# Patient Record
Sex: FEMALE | Race: ASIAN | NOT HISPANIC OR LATINO
[De-identification: names, ages, dates, MRNs, and addresses within clinical notes are randomized per-mention and may not be internally consistent; named-entity substitution may affect disease eponyms.]

---

## 2017-07-28 ENCOUNTER — LAB SERVICES (OUTPATIENT)
Dept: OTHER | Age: 27
End: 2017-07-28

## 2017-07-28 ENCOUNTER — CHARTING TRANS (OUTPATIENT)
Dept: OTHER | Age: 27
End: 2017-07-28

## 2017-07-28 LAB
BILIRUBIN: NORMAL
GLUCOSE U: NORMAL
KETONES: NORMAL
LEUKOCYTES: NORMAL
MONOCLONAL PREGNANCY: POSITIVE
NITRITE: NORMAL
OCCULT BLOOD: NORMAL
PH: 6
PROTEIN: NORMAL
URINE SPEC GRAVITY: 1.02
UROBILINOGEN: 0.2

## 2017-08-02 LAB — HPV I/H RISK 4 DNA CVX QL NAA+PROBE: NORMAL

## 2017-10-13 ENCOUNTER — CHARTING TRANS (OUTPATIENT)
Dept: OTHER | Age: 27
End: 2017-10-13

## 2017-10-19 ENCOUNTER — CHARTING TRANS (OUTPATIENT)
Dept: OTHER | Age: 27
End: 2017-10-19

## 2017-10-19 ENCOUNTER — HOSPITAL (OUTPATIENT)
Dept: OTHER | Age: 27
End: 2017-10-19
Attending: ADVANCED PRACTICE MIDWIFE

## 2017-10-19 ENCOUNTER — LAB SERVICES (OUTPATIENT)
Dept: OTHER | Age: 27
End: 2017-10-19

## 2017-10-19 LAB
BILIRUBIN: NEGATIVE
GLUCOSE U: NEGATIVE
KETONES: NEGATIVE
LEUKOCYTES: NORMAL
NITRITE: NEGATIVE
OCCULT BLOOD: NEGATIVE
PH: 6
PROTEIN: NEGATIVE
URINE SPEC GRAVITY: 1.02
UROBILINOGEN: 0.2

## 2017-10-20 ENCOUNTER — CHARTING TRANS (OUTPATIENT)
Dept: OTHER | Age: 27
End: 2017-10-20

## 2017-10-20 LAB
ABO + RH BLD: NORMAL
BASOPHILS # BLD: 0 K/MCL (ref 0–0.3)
BASOPHILS NFR BLD: 0 %
BLD GP AB SCN SERPL QL: NEGATIVE
DIFFERENTIAL METHOD BLD: ABNORMAL
EOSINOPHIL # BLD: 0.3 K/MCL (ref 0.1–0.5)
EOSINOPHIL NFR BLD: 2 %
ERYTHROCYTE [DISTWIDTH] IN BLOOD: 13.5 % (ref 11–15)
HBV SURFACE AG SER QL: NEGATIVE
HEMATOCRIT: 37.6 % (ref 36–46.5)
HEMOGLOBIN: 12.4 G/DL (ref 12–15.5)
HIV 1+2 AB+HIV1 P24 AG SERPL QL IA: NONREACTIVE
LYMPHOCYTES # BLD: 1.6 K/MCL (ref 1–4.8)
LYMPHOCYTES NFR BLD: 15 %
MEAN CORPUSCULAR HEMOGLOBIN: 31.2 PG (ref 26–34)
MEAN CORPUSCULAR HGB CONC: 33 G/DL (ref 32–36.5)
MEAN CORPUSCULAR VOLUME: 94.5 FL (ref 78–100)
MONOCYTES # BLD: 0.7 K/MCL (ref 0.3–0.9)
MONOCYTES NFR BLD: 6 %
NEUTROPHILS # BLD: 7.9 K/MCL (ref 1.8–7.7)
NEUTROPHILS NFR BLD: 77 %
PLATELET COUNT: 230 K/MCL (ref 140–450)
RED CELL COUNT: 3.98 MIL/MCL (ref 4–5.2)
RUBV IGG SERPL IA-ACNC: 340.3 UNITS/ML
T GONDII IGG SER-ACNC: <0.5 UNITS/ML
T GONDII IGM SER-ACNC: 0.47 INDEX
T PALLIDUM IGG SER QL IA: NONREACTIVE
VZV IGG SER IA-ACNC: 3.55 OD RATIO
WHITE BLOOD COUNT: 10.4 K/MCL (ref 4.2–11)

## 2017-10-23 LAB
BACTERIA UR CULT: NORMAL
C TRACH RRNA SPEC QL NAA+PROBE: NEGATIVE
N GONORRHOEA RRNA SPEC QL NAA+PROBE: NEGATIVE
SPECIMEN SOURCE: NORMAL

## 2017-12-04 ENCOUNTER — CHARTING TRANS (OUTPATIENT)
Dept: OTHER | Age: 27
End: 2017-12-04

## 2018-11-02 VITALS
TEMPERATURE: 98.6 F | HEIGHT: 61 IN | BODY MASS INDEX: 21.04 KG/M2 | RESPIRATION RATE: 16 BRPM | SYSTOLIC BLOOD PRESSURE: 106 MMHG | HEART RATE: 88 BPM | DIASTOLIC BLOOD PRESSURE: 63 MMHG | WEIGHT: 111.44 LBS

## 2018-11-03 VITALS
HEART RATE: 77 BPM | TEMPERATURE: 98.4 F | WEIGHT: 103 LBS | SYSTOLIC BLOOD PRESSURE: 106 MMHG | RESPIRATION RATE: 16 BRPM | DIASTOLIC BLOOD PRESSURE: 69 MMHG | BODY MASS INDEX: 19.45 KG/M2 | HEIGHT: 61 IN

## 2022-02-02 PROBLEM — Z00.00 ENCOUNTER FOR PREVENTIVE HEALTH EXAMINATION: Status: ACTIVE | Noted: 2022-02-02

## 2022-02-03 ENCOUNTER — OUTPATIENT (OUTPATIENT)
Dept: OUTPATIENT SERVICES | Facility: HOSPITAL | Age: 32
LOS: 1 days | Discharge: HOME | End: 2022-02-03
Payer: COMMERCIAL

## 2022-02-03 ENCOUNTER — APPOINTMENT (OUTPATIENT)
Dept: OBGYN | Facility: CLINIC | Age: 32
End: 2022-02-03
Payer: COMMERCIAL

## 2022-02-03 VITALS — DIASTOLIC BLOOD PRESSURE: 65 MMHG | HEART RATE: 88 BPM | SYSTOLIC BLOOD PRESSURE: 106 MMHG

## 2022-02-03 VITALS
DIASTOLIC BLOOD PRESSURE: 64 MMHG | HEART RATE: 83 BPM | TEMPERATURE: 98 F | RESPIRATION RATE: 18 BRPM | SYSTOLIC BLOOD PRESSURE: 115 MMHG

## 2022-02-03 VITALS
BODY MASS INDEX: 26.43 KG/M2 | HEART RATE: 102 BPM | WEIGHT: 140 LBS | HEIGHT: 61 IN | DIASTOLIC BLOOD PRESSURE: 74 MMHG | SYSTOLIC BLOOD PRESSURE: 108 MMHG

## 2022-02-03 DIAGNOSIS — O26.899 OTHER SPECIFIED PREGNANCY RELATED CONDITIONS, UNSPECIFIED TRIMESTER: ICD-10-CM

## 2022-02-03 DIAGNOSIS — Z98.890 OTHER SPECIFIED POSTPROCEDURAL STATES: Chronic | ICD-10-CM

## 2022-02-03 DIAGNOSIS — Z3A.00 WEEKS OF GESTATION OF PREGNANCY NOT SPECIFIED: ICD-10-CM

## 2022-02-03 PROCEDURE — 0502F SUBSEQUENT PRENATAL CARE: CPT

## 2022-02-03 PROCEDURE — 76818 FETAL BIOPHYS PROFILE W/NST: CPT | Mod: 26

## 2022-02-03 PROCEDURE — 59025 FETAL NON-STRESS TEST: CPT

## 2022-02-03 PROCEDURE — 99214 OFFICE O/P EST MOD 30 MIN: CPT

## 2022-02-03 RX ORDER — SODIUM CHLORIDE 9 MG/ML
1000 INJECTION, SOLUTION INTRAVENOUS ONCE
Refills: 0 | Status: COMPLETED | OUTPATIENT
Start: 2022-02-03 | End: 2022-02-03

## 2022-02-03 RX ADMIN — SODIUM CHLORIDE 1000 MILLILITER(S): 9 INJECTION, SOLUTION INTRAVENOUS at 20:35

## 2022-02-03 NOTE — OB PROVIDER TRIAGE NOTE - HISTORY OF PRESENT ILLNESS
30y/o  @38.4wks presents for prolonged monitoring for decelerations in the office. Patient also reports uterine cramping.  Reports good fetal movement  Denies LOF/VB    Allergies: Denies  Medications: Magnesium, PNV    Medical HX: Denies  Surgical HX: C/S 2018  Denies Etoh/Smoke/Drugs/Psy 30y/o  @38.4wks presents for prolonged monitoring for further evaluation s/p "low heart rate in office". Patient also reports uterine cramping 2/10.  Reports good fetal movement  Denies LOF/VB    Allergies: Denies  Medications: Magnesium, PNV    Medical HX: Denies  Surgical HX: C/S 2018  Denies Etoh/Smoke/Drugs/Psy

## 2022-02-03 NOTE — OB PROVIDER TRIAGE NOTE - ADDITIONAL INSTRUCTIONS
Follow up at next prenatal appointment  Patient to have C/S early next week. Repeat  Return for decreased fetal movement, loss of fluid or vaginal bleeding   Increase oral hydration  Signs and Symptoms of Labor reviewed   Kick Counts reviewed

## 2022-02-03 NOTE — OB PROVIDER TRIAGE NOTE - NS_OBGYNHISTORY_OBGYN_ALL_OB_FT
C/S FT 2/1/2018 6#9 NRFHR GYN:   OB:  C/S FT 2/1/2018 6#9 NRFHR      AP course uncomplicated  -Late transfer to Dr. Farias from [place] this week  -Scheduling c/s for early next week, Repeat

## 2022-02-03 NOTE — OB PROVIDER TRIAGE NOTE - NSHPPHYSICALEXAM_GEN_ALL_CORE
Vital Signs Last 24 Hrs  T(C): 36.7 (03 Feb 2022 20:02), Max: 36.7 (03 Feb 2022 20:02)  T(F): 98 (03 Feb 2022 20:02), Max: 98 (03 Feb 2022 20:02)  HR: 84 (03 Feb 2022 21:05) (81 - 96)  BP: 115/64 (03 Feb 2022 20:10) (115/64 - 115/64)  RR: 18 (03 Feb 2022 20:02) (18 - 18)  SpO2: 100% (03 Feb 2022 20:50) (100% - 100%)    Assessment reveals VSS  Abdomen soft, NT, gravid  Cat 1 tracing, ctx every 5 mins  Transabdominal Ultrasound-   Sterile Speculum-  Transvaginal Ultrasound-  Vaginal Exam-   A&Ox3  Lungs- clear bilateral  Heart- normal rate and rhythm      PLAN: Vital Signs Last 24 Hrs  T(C): 36.7 (03 Feb 2022 20:02), Max: 36.7 (03 Feb 2022 20:02)  T(F): 98 (03 Feb 2022 20:02), Max: 98 (03 Feb 2022 20:02)  HR: 84 (03 Feb 2022 21:05) (81 - 96)  BP: 115/64 (03 Feb 2022 20:10) (115/64 - 115/64)  RR: 18 (03 Feb 2022 20:02) (18 - 18)  SpO2: 100% (03 Feb 2022 20:50) (100% - 100%)    Assessment reveals VSS  Abdomen soft, NT, gravid  Cat 1 tracing, irregular ctx on toco   Transabdominal Ultrasound- vtx, post placenta, bpp 8/8, bethany: 13.18  Vaginal Exam- 1/60/-3  A&Ox3  Lungs- clear bilateral  Heart- normal rate and rhythm      PLAN: D/C Home

## 2022-02-03 NOTE — OB PROVIDER TRIAGE NOTE - PLAN OF CARE
D/C Home  D/W Dr. Nettles  NST reactive  No evidence of acute process  Normal fetal testing  Follow up at next prenatal appointment  Patient to have C/S early next week. Repeat  Return for decreased fetal movement, loss of fluid or vaginal bleeding   Increase oral hydration  Signs and Symptoms of Labor reviewed   Kick Counts reviewed

## 2022-02-03 NOTE — OB RN TRIAGE NOTE - FALL HARM RISK - UNIVERSAL INTERVENTIONS
Bed in lowest position, wheels locked, appropriate side rails in place/Call bell, personal items and telephone in reach/Instruct patient to call for assistance before getting out of bed or chair/Non-slip footwear when patient is out of bed/[place] to call system/Physically safe environment - no spills, clutter or unnecessary equipment/Purposeful Proactive Rounding/Room/bathroom lighting operational, light cord in reach

## 2022-02-04 ENCOUNTER — TRANSCRIPTION ENCOUNTER (OUTPATIENT)
Age: 32
End: 2022-02-04

## 2022-02-04 ENCOUNTER — APPOINTMENT (OUTPATIENT)
Dept: OBGYN | Facility: CLINIC | Age: 32
End: 2022-02-04

## 2022-02-04 DIAGNOSIS — O34.211 MATERNAL CARE FOR LOW TRANSVERSE SCAR FROM PREVIOUS CESAREAN DELIVERY: ICD-10-CM

## 2022-02-04 DIAGNOSIS — Z01.818 ENCOUNTER FOR OTHER PREPROCEDURAL EXAMINATION: ICD-10-CM

## 2022-02-04 LAB — SARS-COV-2 N GENE NPH QL NAA+PROBE: NOT DETECTED

## 2022-02-06 ENCOUNTER — TRANSCRIPTION ENCOUNTER (OUTPATIENT)
Age: 32
End: 2022-02-06

## 2022-02-07 ENCOUNTER — INPATIENT (INPATIENT)
Facility: HOSPITAL | Age: 32
LOS: 2 days | Discharge: HOME | End: 2022-02-10
Attending: STUDENT IN AN ORGANIZED HEALTH CARE EDUCATION/TRAINING PROGRAM | Admitting: STUDENT IN AN ORGANIZED HEALTH CARE EDUCATION/TRAINING PROGRAM
Payer: COMMERCIAL

## 2022-02-07 ENCOUNTER — TRANSCRIPTION ENCOUNTER (OUTPATIENT)
Age: 32
End: 2022-02-07

## 2022-02-07 ENCOUNTER — APPOINTMENT (OUTPATIENT)
Dept: OBGYN | Facility: HOSPITAL | Age: 32
End: 2022-02-07

## 2022-02-07 VITALS — HEART RATE: 81 BPM | DIASTOLIC BLOOD PRESSURE: 61 MMHG | SYSTOLIC BLOOD PRESSURE: 108 MMHG

## 2022-02-07 DIAGNOSIS — Z98.891 HISTORY OF UTERINE SCAR FROM PREVIOUS SURGERY: ICD-10-CM

## 2022-02-07 DIAGNOSIS — O34.211 MATERNAL CARE FOR LOW TRANSVERSE SCAR FROM PREVIOUS CESAREAN DELIVERY: ICD-10-CM

## 2022-02-07 DIAGNOSIS — O34.219 MATERNAL CARE FOR UNSPECIFIED TYPE SCAR FROM PREVIOUS CESAREAN DELIVERY: ICD-10-CM

## 2022-02-07 DIAGNOSIS — Z98.890 OTHER SPECIFIED POSTPROCEDURAL STATES: Chronic | ICD-10-CM

## 2022-02-07 LAB
BASOPHILS # BLD AUTO: 0.03 K/UL — SIGNIFICANT CHANGE UP (ref 0–0.2)
BASOPHILS NFR BLD AUTO: 0.3 % — SIGNIFICANT CHANGE UP (ref 0–2)
BLD GP AB SCN SERPL QL: NEGATIVE — SIGNIFICANT CHANGE UP
COVID-19 SPIKE DOMAIN AB INTERP: POSITIVE
COVID-19 SPIKE DOMAIN ANTIBODY RESULT: >250 U/ML — HIGH
EOSINOPHIL # BLD AUTO: 0.23 K/UL — SIGNIFICANT CHANGE UP (ref 0–0.5)
EOSINOPHIL NFR BLD AUTO: 2.4 % — SIGNIFICANT CHANGE UP (ref 0–6)
HBV SURFACE AG SERPL QL IA: SIGNIFICANT CHANGE UP
HCT VFR BLD CALC: 36.7 % — SIGNIFICANT CHANGE UP (ref 34.5–45)
HGB BLD-MCNC: 12.5 G/DL — SIGNIFICANT CHANGE UP (ref 11.5–15.5)
HIV 1+2 AB+HIV1 P24 AG SERPL QL IA: SIGNIFICANT CHANGE UP
IANC: 7.02 K/UL — SIGNIFICANT CHANGE UP (ref 1.5–8.5)
IMM GRANULOCYTES NFR BLD AUTO: 1.2 % — SIGNIFICANT CHANGE UP (ref 0–1.5)
LYMPHOCYTES # BLD AUTO: 1.66 K/UL — SIGNIFICANT CHANGE UP (ref 1–3.3)
LYMPHOCYTES # BLD AUTO: 17 % — SIGNIFICANT CHANGE UP (ref 13–44)
MCHC RBC-ENTMCNC: 30.3 PG — SIGNIFICANT CHANGE UP (ref 27–34)
MCHC RBC-ENTMCNC: 34.1 GM/DL — SIGNIFICANT CHANGE UP (ref 32–36)
MCV RBC AUTO: 89.1 FL — SIGNIFICANT CHANGE UP (ref 80–100)
MONOCYTES # BLD AUTO: 0.69 K/UL — SIGNIFICANT CHANGE UP (ref 0–0.9)
MONOCYTES NFR BLD AUTO: 7.1 % — SIGNIFICANT CHANGE UP (ref 2–14)
NEUTROPHILS # BLD AUTO: 7.02 K/UL — SIGNIFICANT CHANGE UP (ref 1.8–7.4)
NEUTROPHILS NFR BLD AUTO: 72 % — SIGNIFICANT CHANGE UP (ref 43–77)
NRBC # BLD: 0 /100 WBCS — SIGNIFICANT CHANGE UP
NRBC # FLD: 0 K/UL — SIGNIFICANT CHANGE UP
PLATELET # BLD AUTO: 224 K/UL — SIGNIFICANT CHANGE UP (ref 150–400)
RBC # BLD: 4.12 M/UL — SIGNIFICANT CHANGE UP (ref 3.8–5.2)
RBC # FLD: 13.2 % — SIGNIFICANT CHANGE UP (ref 10.3–14.5)
RH IG SCN BLD-IMP: POSITIVE — SIGNIFICANT CHANGE UP
RUBV IGG SER-ACNC: 8.6 INDEX — SIGNIFICANT CHANGE UP
RUBV IGG SER-IMP: POSITIVE — SIGNIFICANT CHANGE UP
SARS-COV-2 IGG+IGM SERPL QL IA: >250 U/ML — HIGH
SARS-COV-2 IGG+IGM SERPL QL IA: POSITIVE
T PALLIDUM AB TITR SER: NEGATIVE — SIGNIFICANT CHANGE UP
WBC # BLD: 9.75 K/UL — SIGNIFICANT CHANGE UP (ref 3.8–10.5)
WBC # FLD AUTO: 9.75 K/UL — SIGNIFICANT CHANGE UP (ref 3.8–10.5)

## 2022-02-07 PROCEDURE — 59510 CESAREAN DELIVERY: CPT | Mod: U9,GC

## 2022-02-07 PROCEDURE — 59515 CESAREAN DELIVERY: CPT | Mod: U9,GC

## 2022-02-07 RX ORDER — OXYTOCIN 10 UNIT/ML
333.33 VIAL (ML) INJECTION
Qty: 20 | Refills: 0 | Status: DISCONTINUED | OUTPATIENT
Start: 2022-02-07 | End: 2022-02-07

## 2022-02-07 RX ORDER — SODIUM CHLORIDE 9 MG/ML
1000 INJECTION, SOLUTION INTRAVENOUS
Refills: 0 | Status: DISCONTINUED | OUTPATIENT
Start: 2022-02-07 | End: 2022-02-07

## 2022-02-07 RX ORDER — NALOXONE HYDROCHLORIDE 4 MG/.1ML
0.1 SPRAY NASAL
Refills: 0 | Status: DISCONTINUED | OUTPATIENT
Start: 2022-02-07 | End: 2022-02-07

## 2022-02-07 RX ORDER — CITRIC ACID/SODIUM CITRATE 300-500 MG
30 SOLUTION, ORAL ORAL ONCE
Refills: 0 | Status: COMPLETED | OUTPATIENT
Start: 2022-02-07 | End: 2022-02-07

## 2022-02-07 RX ORDER — FAMOTIDINE 10 MG/ML
20 INJECTION INTRAVENOUS ONCE
Refills: 0 | Status: DISCONTINUED | OUTPATIENT
Start: 2022-02-07 | End: 2022-02-07

## 2022-02-07 RX ORDER — FAMOTIDINE 10 MG/ML
20 INJECTION INTRAVENOUS ONCE
Refills: 0 | Status: COMPLETED | OUTPATIENT
Start: 2022-02-07 | End: 2022-02-07

## 2022-02-07 RX ORDER — DIPHENHYDRAMINE HCL 50 MG
25 CAPSULE ORAL EVERY 6 HOURS
Refills: 0 | Status: DISCONTINUED | OUTPATIENT
Start: 2022-02-07 | End: 2022-02-10

## 2022-02-07 RX ORDER — LANOLIN
1 OINTMENT (GRAM) TOPICAL EVERY 6 HOURS
Refills: 0 | Status: DISCONTINUED | OUTPATIENT
Start: 2022-02-07 | End: 2022-02-10

## 2022-02-07 RX ORDER — OXYCODONE HYDROCHLORIDE 5 MG/1
5 TABLET ORAL ONCE
Refills: 0 | Status: DISCONTINUED | OUTPATIENT
Start: 2022-02-07 | End: 2022-02-10

## 2022-02-07 RX ORDER — SODIUM CHLORIDE 9 MG/ML
1000 INJECTION, SOLUTION INTRAVENOUS ONCE
Refills: 0 | Status: COMPLETED | OUTPATIENT
Start: 2022-02-07 | End: 2022-02-07

## 2022-02-07 RX ORDER — TETANUS TOXOID, REDUCED DIPHTHERIA TOXOID AND ACELLULAR PERTUSSIS VACCINE, ADSORBED 5; 2.5; 8; 8; 2.5 [IU]/.5ML; [IU]/.5ML; UG/.5ML; UG/.5ML; UG/.5ML
0.5 SUSPENSION INTRAMUSCULAR ONCE
Refills: 0 | Status: DISCONTINUED | OUTPATIENT
Start: 2022-02-07 | End: 2022-02-10

## 2022-02-07 RX ORDER — DEXAMETHASONE 0.5 MG/5ML
4 ELIXIR ORAL EVERY 6 HOURS
Refills: 0 | Status: DISCONTINUED | OUTPATIENT
Start: 2022-02-07 | End: 2022-02-07

## 2022-02-07 RX ORDER — IBUPROFEN 200 MG
600 TABLET ORAL EVERY 6 HOURS
Refills: 0 | Status: COMPLETED | OUTPATIENT
Start: 2022-02-07 | End: 2023-01-06

## 2022-02-07 RX ORDER — SODIUM CHLORIDE 9 MG/ML
1000 INJECTION, SOLUTION INTRAVENOUS ONCE
Refills: 0 | Status: DISCONTINUED | OUTPATIENT
Start: 2022-02-07 | End: 2022-02-07

## 2022-02-07 RX ORDER — HEPARIN SODIUM 5000 [USP'U]/ML
5000 INJECTION INTRAVENOUS; SUBCUTANEOUS EVERY 12 HOURS
Refills: 0 | Status: DISCONTINUED | OUTPATIENT
Start: 2022-02-07 | End: 2022-02-10

## 2022-02-07 RX ORDER — MAGNESIUM HYDROXIDE 400 MG/1
30 TABLET, CHEWABLE ORAL
Refills: 0 | Status: DISCONTINUED | OUTPATIENT
Start: 2022-02-07 | End: 2022-02-10

## 2022-02-07 RX ORDER — ONDANSETRON 8 MG/1
4 TABLET, FILM COATED ORAL EVERY 6 HOURS
Refills: 0 | Status: DISCONTINUED | OUTPATIENT
Start: 2022-02-07 | End: 2022-02-07

## 2022-02-07 RX ORDER — KETOROLAC TROMETHAMINE 30 MG/ML
30 SYRINGE (ML) INJECTION EVERY 6 HOURS
Refills: 0 | Status: DISCONTINUED | OUTPATIENT
Start: 2022-02-07 | End: 2022-02-08

## 2022-02-07 RX ORDER — CITRIC ACID/SODIUM CITRATE 300-500 MG
30 SOLUTION, ORAL ORAL ONCE
Refills: 0 | Status: DISCONTINUED | OUTPATIENT
Start: 2022-02-07 | End: 2022-02-07

## 2022-02-07 RX ORDER — SIMETHICONE 80 MG/1
80 TABLET, CHEWABLE ORAL EVERY 4 HOURS
Refills: 0 | Status: DISCONTINUED | OUTPATIENT
Start: 2022-02-07 | End: 2022-02-10

## 2022-02-07 RX ORDER — OXYCODONE HYDROCHLORIDE 5 MG/1
5 TABLET ORAL
Refills: 0 | Status: DISCONTINUED | OUTPATIENT
Start: 2022-02-07 | End: 2022-02-10

## 2022-02-07 RX ORDER — ACETAMINOPHEN 500 MG
975 TABLET ORAL
Refills: 0 | Status: DISCONTINUED | OUTPATIENT
Start: 2022-02-07 | End: 2022-02-10

## 2022-02-07 RX ORDER — INFLUENZA VIRUS VACCINE 15; 15; 15; 15 UG/.5ML; UG/.5ML; UG/.5ML; UG/.5ML
0.5 SUSPENSION INTRAMUSCULAR ONCE
Refills: 0 | Status: DISCONTINUED | OUTPATIENT
Start: 2022-02-07 | End: 2022-02-10

## 2022-02-07 RX ADMIN — Medication 30 MILLIGRAM(S): at 18:40

## 2022-02-07 RX ADMIN — Medication 30 MILLILITER(S): at 09:02

## 2022-02-07 RX ADMIN — Medication 975 MILLIGRAM(S): at 14:30

## 2022-02-07 RX ADMIN — Medication 975 MILLIGRAM(S): at 21:22

## 2022-02-07 RX ADMIN — Medication 30 MILLIGRAM(S): at 23:32

## 2022-02-07 RX ADMIN — Medication 1000 MILLIUNIT(S)/MIN: at 12:35

## 2022-02-07 RX ADMIN — SODIUM CHLORIDE 75 MILLILITER(S): 9 INJECTION, SOLUTION INTRAVENOUS at 12:36

## 2022-02-07 RX ADMIN — SODIUM CHLORIDE 2000 MILLILITER(S): 9 INJECTION, SOLUTION INTRAVENOUS at 09:02

## 2022-02-07 RX ADMIN — FAMOTIDINE 20 MILLIGRAM(S): 10 INJECTION INTRAVENOUS at 09:02

## 2022-02-07 RX ADMIN — HEPARIN SODIUM 5000 UNIT(S): 5000 INJECTION INTRAVENOUS; SUBCUTANEOUS at 18:40

## 2022-02-07 RX ADMIN — Medication 975 MILLIGRAM(S): at 22:00

## 2022-02-07 RX ADMIN — Medication 975 MILLIGRAM(S): at 15:54

## 2022-02-07 NOTE — OB RN PATIENT PROFILE - FALL HARM RISK - UNIVERSAL INTERVENTIONS
Bed in lowest position, wheels locked, appropriate side rails in place/Call bell, personal items and telephone in reach/Instruct patient to call for assistance before getting out of bed or chair/Non-slip footwear when patient is out of bed/[place] to call system/Physically safe environment - no spills, clutter or unnecessary equipment/Purposeful Proactive Rounding/Room/bathroom lighting operational, light cord in reach

## 2022-02-07 NOTE — BRIEF OPERATIVE NOTE - OPERATION/FINDINGS
Viable female infant, apgar 9/9, wgt 3390gms  delivered @ 1038AM   cephalic presentation, clear copious fluid  hysterotomy closed in single layer, otherwise grossly nl uterus, tubes & ovaries    QBL: 225 cc  UOP: 625 cc  IVF: 2500 cc

## 2022-02-07 NOTE — DISCHARGE NOTE OB - PATIENT PORTAL LINK FT
You can access the FollowMyHealth Patient Portal offered by [facility] by registering at the following website: [email or web address]. By joining [employer]’s FollowMyHealth portal, you will also be able to view your health information using other applications (apps) compatible with our system.

## 2022-02-07 NOTE — OB RN PATIENT PROFILE - ABORTIONS, OB PROFILE
Duration Of Freeze Thaw-Cycle (Seconds): 4 Detail Level: Detailed Consent: The patient's consent was obtained including but not limited to risks of crusting, scabbing, blistering, scarring, darker or lighter pigmentary change, recurrence, incomplete removal and infection. Number Of Freeze-Thaw Cycles: 1 freeze-thaw cycle Render Note In Bullet Format When Appropriate: No Post-Care Instructions: I reviewed with the patient in detail post-care instructions. Patient is to wear sunprotection, and avoid picking at any of the treated lesions. Pt may apply Vaseline to crusted or scabbing areas. 0

## 2022-02-07 NOTE — DISCHARGE NOTE OB - CARE PROVIDER_API CALL
Cirilo Farias)  OBSGYN  General  [facility] [facility], [address]th [place] [address]  Phone: [phone]  Fax: [phone]  Follow Up Time:

## 2022-02-07 NOTE — OB PROVIDER H&P - HISTORY OF PRESENT ILLNESS
30yo female  EDC 2022  presents@ 39.1 wks for scheduled  rltcs.  AP course otherwise unremarkable. Denies SOB,fever, chills or cough.    Denies exposure to COVID-19, negative COVID-19 test(2022)  Denies VB,ROM or UCs today.  +FM

## 2022-02-07 NOTE — DISCHARGE NOTE OB - HOSPITAL COURSE
status post rLTCS. Upon discharge, patient is spontaneously voiding, tolerating a regular diet, out of bed, and reports adequate pain control

## 2022-02-07 NOTE — DISCHARGE NOTE OB - NS MD DC FALL RISK RISK
For information on Fall & Injury Prevention, visit: [email or web address] OR  [email or web address] OR  https://www.cdc.gov/steadi/patient.html

## 2022-02-07 NOTE — DISCHARGE NOTE OB - MEDICATION SUMMARY - MEDICATIONS TO TAKE
I will START or STAY ON the medications listed below when I get home from the hospital:  None I will START or STAY ON the medications listed below when I get home from the hospital:    ibuprofen 600 mg oral tablet  -- 1 tab(s) by mouth every 6 hours  -- Indication: For  delivery delivered

## 2022-02-07 NOTE — OB PROVIDER H&P - NSHPPHYSICALEXAM_GEN_ALL_CORE
T(C): --  HR: --  BP: --  RR: --  SpO2: --    A&Ox3  Heart: RRR, S1&S2, no S3  Lungs: Clear bilateral to auscultation, good inspiratory /expiratory effort              no rhonchi, no rales  Abd: soft, Gravid, TOCO in place           +pfannenstial scar/no scar/keloid/hypertrophied  EFM : 130 bpm/moderate variabilty/+accelerations/no decelerations/CAT 1  TOCO:  no UC  Ext:  FROM /bilateral  1+ LE edema  Neuro: grossly intact

## 2022-02-07 NOTE — OB PROVIDER H&P - ASSESSMENT
Admit to LKELI Farias MD-Service  Dx: scheduled rLTCS/pLTCS  Dx:  NPO  routine labs  EFM/TOCO  Bedside TLTAS  anesthesia consult  Mary Ann Monson, C-EFM  02-07-22 @ 08:17

## 2022-02-07 NOTE — OB RN INTRAOPERATIVE NOTE - NSSELHIDDEN_OBGYN_ALL_OB_FT
[NS_DeliveryRN_OBGYN_ALL_OB_FT:[id number]] [NS_DeliveryRN_OBGYN_ALL_OB_FT:[id number]==],[NS_DeliveryAssist1_OBGYN_ALL_OB_FT:[id number]=]

## 2022-02-07 NOTE — OB PROVIDER DELIVERY SUMMARY - NSSELHIDDEN_OBGYN_ALL_OB_FT
[NS_DeliveryRN_OBGYN_ALL_OB_FT:[id number]],[NS_DeliveryAssist1_OBGYN_ALL_OB_FT:[id number]=]

## 2022-02-07 NOTE — OB PROVIDER DELIVERY SUMMARY - NSPROVIDERDELIVERYNOTE_OBGYN_ALL_OB_FT
Viable female infant, apgar 9/9, wgt 3390gms  delivered @ 1038AM   cephalic presentation, clear copious fluid  hysterotomy closed in single layer, otherwise grossly nl uterus, tubes & ovaries    QBL: 225 cc  UOP: 625 cc  IVF: 2500 cc   Dictation Number:   22 @ 11:40 Viable female infant, apgar 9/9, wgt 3390gms  delivered @ 1038AM   cephalic presentation, clear copious fluid  hysterotomy closed in single layer, otherwise grossly nl uterus, tubes & ovaries    QBL: 225 cc  UOP: 625 cc  IVF: 2500 cc   Dictation Number: [id number]  22 @ 11:40

## 2022-02-07 NOTE — OB RN DELIVERY SUMMARY - NSSELHIDDEN_OBGYN_ALL_OB_FT
[NS_DeliveryRN_OBGYN_ALL_OB_FT:[id number]==],[NS_DeliveryAssist1_OBGYN_ALL_OB_FT:[id number]] [NS_DeliveryRN_OBGYN_ALL_OB_FT:[id number]==],[NS_DeliveryAssist1_OBGYN_ALL_OB_FT:[id number]],[NS_DeliveryAttending1_OBGYN_ALL_OB_FT:[id number]==]

## 2022-02-07 NOTE — OB RN PATIENT PROFILE - FUNCTIONAL ASSESSMENT - DAILY ACTIVITY 2.
2 Staples in scalp need removal in 5-7 days. Apply Neosporin to the wound 2-3 times per day for the next 10-14 days.
4 = No assist / stand by assistance

## 2022-02-08 LAB
ALBUMIN SERPL ELPH-MCNC: 2.8 G/DL — LOW (ref 3.3–5)
ALP SERPL-CCNC: 148 U/L — HIGH (ref 40–120)
ALT FLD-CCNC: 9 U/L — SIGNIFICANT CHANGE UP (ref 4–33)
ANION GAP SERPL CALC-SCNC: 12 MMOL/L — SIGNIFICANT CHANGE UP (ref 7–14)
AST SERPL-CCNC: 23 U/L — SIGNIFICANT CHANGE UP (ref 4–32)
BASOPHILS # BLD AUTO: 0.04 K/UL — SIGNIFICANT CHANGE UP (ref 0–0.2)
BASOPHILS NFR BLD AUTO: 0.3 % — SIGNIFICANT CHANGE UP (ref 0–2)
BILIRUB SERPL-MCNC: 0.4 MG/DL — SIGNIFICANT CHANGE UP (ref 0.2–1.2)
BUN SERPL-MCNC: 7 MG/DL — SIGNIFICANT CHANGE UP (ref 7–23)
CALCIUM SERPL-MCNC: 8.7 MG/DL — SIGNIFICANT CHANGE UP (ref 8.4–10.5)
CHLORIDE SERPL-SCNC: 104 MMOL/L — SIGNIFICANT CHANGE UP (ref 98–107)
CO2 SERPL-SCNC: 21 MMOL/L — LOW (ref 22–31)
CREAT SERPL-MCNC: 0.67 MG/DL — SIGNIFICANT CHANGE UP (ref 0.5–1.3)
EOSINOPHIL # BLD AUTO: 0.1 K/UL — SIGNIFICANT CHANGE UP (ref 0–0.5)
EOSINOPHIL NFR BLD AUTO: 0.7 % — SIGNIFICANT CHANGE UP (ref 0–6)
GLUCOSE BLDC GLUCOMTR-MCNC: 98 MG/DL — SIGNIFICANT CHANGE UP (ref 70–99)
GLUCOSE SERPL-MCNC: 97 MG/DL — SIGNIFICANT CHANGE UP (ref 70–99)
HCT VFR BLD CALC: 25.5 % — LOW (ref 34.5–45)
HCT VFR BLD CALC: 30.9 % — LOW (ref 34.5–45)
HGB BLD-MCNC: 8.4 G/DL — LOW (ref 11.5–15.5)
HGB BLD-MCNC: 9.9 G/DL — LOW (ref 11.5–15.5)
IANC: 10.99 K/UL — HIGH (ref 1.5–8.5)
IMM GRANULOCYTES NFR BLD AUTO: 0.6 % — SIGNIFICANT CHANGE UP (ref 0–1.5)
INR BLD: 0.91 RATIO — SIGNIFICANT CHANGE UP (ref 0.88–1.16)
LYMPHOCYTES # BLD AUTO: 15.5 % — SIGNIFICANT CHANGE UP (ref 13–44)
LYMPHOCYTES # BLD AUTO: 2.21 K/UL — SIGNIFICANT CHANGE UP (ref 1–3.3)
MCHC RBC-ENTMCNC: 29.4 PG — SIGNIFICANT CHANGE UP (ref 27–34)
MCHC RBC-ENTMCNC: 30 PG — SIGNIFICANT CHANGE UP (ref 27–34)
MCHC RBC-ENTMCNC: 32 GM/DL — SIGNIFICANT CHANGE UP (ref 32–36)
MCHC RBC-ENTMCNC: 32.9 GM/DL — SIGNIFICANT CHANGE UP (ref 32–36)
MCV RBC AUTO: 91.1 FL — SIGNIFICANT CHANGE UP (ref 80–100)
MCV RBC AUTO: 91.7 FL — SIGNIFICANT CHANGE UP (ref 80–100)
MONOCYTES # BLD AUTO: 0.83 K/UL — SIGNIFICANT CHANGE UP (ref 0–0.9)
MONOCYTES NFR BLD AUTO: 5.8 % — SIGNIFICANT CHANGE UP (ref 2–14)
NEUTROPHILS # BLD AUTO: 10.99 K/UL — HIGH (ref 1.8–7.4)
NEUTROPHILS NFR BLD AUTO: 77.1 % — HIGH (ref 43–77)
NRBC # BLD: 0 /100 WBCS — SIGNIFICANT CHANGE UP
NRBC # BLD: 0 /100 WBCS — SIGNIFICANT CHANGE UP
NRBC # FLD: 0 K/UL — SIGNIFICANT CHANGE UP
NRBC # FLD: 0 K/UL — SIGNIFICANT CHANGE UP
PLATELET # BLD AUTO: 169 K/UL — SIGNIFICANT CHANGE UP (ref 150–400)
PLATELET # BLD AUTO: 192 K/UL — SIGNIFICANT CHANGE UP (ref 150–400)
POTASSIUM SERPL-MCNC: 4 MMOL/L — SIGNIFICANT CHANGE UP (ref 3.5–5.3)
POTASSIUM SERPL-SCNC: 4 MMOL/L — SIGNIFICANT CHANGE UP (ref 3.5–5.3)
PROT SERPL-MCNC: 5.3 G/DL — LOW (ref 6–8.3)
PROTHROM AB SERPL-ACNC: 10.4 SEC — LOW (ref 10.6–13.6)
RBC # BLD: 2.8 M/UL — LOW (ref 3.8–5.2)
RBC # BLD: 3.37 M/UL — LOW (ref 3.8–5.2)
RBC # FLD: 13.1 % — SIGNIFICANT CHANGE UP (ref 10.3–14.5)
RBC # FLD: 13.3 % — SIGNIFICANT CHANGE UP (ref 10.3–14.5)
SODIUM SERPL-SCNC: 137 MMOL/L — SIGNIFICANT CHANGE UP (ref 135–145)
WBC # BLD: 14.25 K/UL — HIGH (ref 3.8–10.5)
WBC # BLD: 14.57 K/UL — HIGH (ref 3.8–10.5)
WBC # FLD AUTO: 14.25 K/UL — HIGH (ref 3.8–10.5)
WBC # FLD AUTO: 14.57 K/UL — HIGH (ref 3.8–10.5)

## 2022-02-08 RX ORDER — IBUPROFEN 200 MG
600 TABLET ORAL EVERY 6 HOURS
Refills: 0 | Status: DISCONTINUED | OUTPATIENT
Start: 2022-02-08 | End: 2022-02-10

## 2022-02-08 RX ORDER — SODIUM CHLORIDE 9 MG/ML
500 INJECTION, SOLUTION INTRAVENOUS ONCE
Refills: 0 | Status: DISCONTINUED | OUTPATIENT
Start: 2022-02-08 | End: 2022-02-10

## 2022-02-08 RX ORDER — MORPHINE SULFATE 50 MG/1
4 CAPSULE, EXTENDED RELEASE ORAL ONCE
Refills: 0 | Status: DISCONTINUED | OUTPATIENT
Start: 2022-02-08 | End: 2022-02-08

## 2022-02-08 RX ORDER — KETOROLAC TROMETHAMINE 30 MG/ML
30 SYRINGE (ML) INJECTION ONCE
Refills: 0 | Status: DISCONTINUED | OUTPATIENT
Start: 2022-02-08 | End: 2022-02-08

## 2022-02-08 RX ORDER — SODIUM CHLORIDE 9 MG/ML
1000 INJECTION, SOLUTION INTRAVENOUS
Refills: 0 | Status: DISCONTINUED | OUTPATIENT
Start: 2022-02-08 | End: 2022-02-10

## 2022-02-08 RX ORDER — SODIUM CHLORIDE 9 MG/ML
1000 INJECTION, SOLUTION INTRAVENOUS ONCE
Refills: 0 | Status: DISCONTINUED | OUTPATIENT
Start: 2022-02-08 | End: 2022-02-08

## 2022-02-08 RX ADMIN — HEPARIN SODIUM 5000 UNIT(S): 5000 INJECTION INTRAVENOUS; SUBCUTANEOUS at 18:25

## 2022-02-08 RX ADMIN — Medication 30 MILLIGRAM(S): at 12:08

## 2022-02-08 RX ADMIN — Medication 975 MILLIGRAM(S): at 02:34

## 2022-02-08 RX ADMIN — HEPARIN SODIUM 5000 UNIT(S): 5000 INJECTION INTRAVENOUS; SUBCUTANEOUS at 05:55

## 2022-02-08 RX ADMIN — MAGNESIUM HYDROXIDE 30 MILLILITER(S): 400 TABLET, CHEWABLE ORAL at 18:32

## 2022-02-08 RX ADMIN — Medication 0.2 MILLIGRAM(S): at 16:47

## 2022-02-08 RX ADMIN — Medication 975 MILLIGRAM(S): at 20:06

## 2022-02-08 RX ADMIN — Medication 0.2 MILLIGRAM(S): at 07:52

## 2022-02-08 RX ADMIN — Medication 600 MILLIGRAM(S): at 18:55

## 2022-02-08 RX ADMIN — Medication 0.2 MILLIGRAM(S): at 20:05

## 2022-02-08 RX ADMIN — Medication 30 MILLIGRAM(S): at 00:06

## 2022-02-08 RX ADMIN — Medication 975 MILLIGRAM(S): at 20:30

## 2022-02-08 RX ADMIN — Medication 30 MILLIGRAM(S): at 05:55

## 2022-02-08 RX ADMIN — Medication 30 MILLIGRAM(S): at 06:30

## 2022-02-08 RX ADMIN — Medication 975 MILLIGRAM(S): at 03:02

## 2022-02-08 RX ADMIN — Medication 600 MILLIGRAM(S): at 18:25

## 2022-02-08 RX ADMIN — Medication 30 MILLIGRAM(S): at 12:45

## 2022-02-08 RX ADMIN — Medication 0.2 MILLIGRAM(S): at 11:46

## 2022-02-08 NOTE — PROVIDER CONTACT NOTE (OTHER) - BACKGROUND
Repeat   at 1030. EBL of 120. Patient was found in bathroom very pale, dizzy, hearing noises, and shaky.

## 2022-02-08 NOTE — PROGRESS NOTE ADULT - ASSESSMENT
A/P: 30yo POD#1 s/p uncomplicated rLTCS with vasovagal episode POD#1 likely 2/2 acute symptomatic blood loss anemia. BSS showing clots in uterus, no color flow and low suspicion for retained products. No active bleeding, no additional clots expressed on exam. Pt HDS at this time.  #acute symptomatic blood loss anemia with vasovagal episode  - AM CBC with elevated drop for EBL (250)  - Stat CMP, Coags wnl  - s/p 500 cc LR bolus, toradol x1  - 1L LR @125cc  - Repeat 4-hour CBC  - continue pad counts  - Start methergine series  - UOP adequate, continue to monitor  - Repeat orthostatic VS following breakfast  - iron, vitamin C    #POD#1  - Incision dressing removed POD#1  - Continue regular diet.  - Increase ambulation as tolerated  - Continue motrin, tylenol, oxycodone PRN for pain control.      Pt seen and evaluated with Dr. Eastman PGY4  D/w Dr. Dinora Sevilla, PGY-1

## 2022-02-08 NOTE — PROVIDER CONTACT NOTE (OTHER) - ASSESSMENT
Patient passed a clot the size of an egg in the toilet when voiding. Patient was pale, hearing noises and in pain. First BP reading of 86/48 heart rate 97; second BP reading of 120/97 and heart rate of 150. Glucose level of 98. Ultrasound was done by Dr. Eladia Panda Patient passed a clot the size of an egg in the toilet when voiding. Patient was pale, hearing noises and in pain. First BP reading of 86/48 heart rate 97; second BP reading of 120/97 and heart rate of 150. Glucose level of 98. Ultrasound was done by Dr. Autumn Sevilla

## 2022-02-08 NOTE — PROVIDER CONTACT NOTE (OTHER) - SITUATION
Your Child's Health  Six-Month-Old Visit      Jordon Chaudhari  September 11, 2017    There were no vitals taken for this visit.  Weight:     NUTRITION:  In the next few months, Jordon can adopt a feeding schedule closer to yours.  While you may not choose to feed him at the table, having Jordon sit with you will help him become accustomed to family meals.  Babies are ready for finger foods at about the time that they can sit alone (7-8 months) and start to use their index finger separately from their other fingers (about 9 months).  Much will depend on the baby's temperament.  Some babies are \"nibblers\" and do well with finger foods early; others push the food into their mouths to the point of gagging.  If you try finger foods and your baby has a problem with them, wait a week or so before trying again.  Start with finger foods that will become soft with saliva - for example, teething biscuits, cereal pieces, etc.  Teeth are not necessary at this point.       Because we have less sunlight in [place] part of the [place], infants whose only source of milk is mother's milk should have Vitamin D supplements (available without prescription at the drug store) each day.      We no longer consider juice a health food.  When fruits were not available, it was a nutritional help, but fruits are much better nutritionally than juice. Amounts of juice over 4 oz. per day are associated with an increased risk of obesity.    Avoid using food, especially sweets, to keep Jordon from crying.    FLUORIDE: Jordon will begin depositing enamel on the permanent teeth at this age.  Ask your pediatrician or nurse whether fluoride supplements are advisable if you have not already discussed this.    DEVELOPMENT/BEHAVIOR:  Over the next few months, many babies will push up on outstretched arms, and they will then go up on their hands and knees.  After rocking back and forth for a few weeks, they may start crawling.  Some will try to pull up to a  Patient  pulled emergency bell in bathroom stating patient not feeling well standing position.   Many babies, however, do not crawl until after walking.  Single consonant sounds (such as \"da,\" \"ma,\" or \"ba\") will lead to repetitive sounds (\"jesús,\" \"mama,\" or \"baba\").  At this age, babies grab everything, and it all goes into their mouths; so you should be careful to protect your baby from choking and poisoning.  Most babies at this age can sleep all night, often ten to twelve hours.  Most babies take at least one nap a day, but some don't nap at all.    ACCIDENT PREVENTION:  1.  Falls:  Use vega on stairways and at the entrances to rooms that cannot be child-proofed.  2.  Scalding:  Adjust your hot water heater temperature to 120-130 degrees F.  Guard hot radiators or vents.  3.  Walkers:  These have been associated with a variety of injuries.  Stationary saucers and/or jumpers are safer.  4.  Electricity:  Protect Jordon from access to electrical cords and outlets. Use safety plugs.  5.  Poisoning:  Remove the following items from reach or place them in a locked cabinet:     Cleaning products     Kerosene (lamp oil)     Paint     Pesticides     Purses (which sometimes contain medicines)     Plants      Medicines, including vitamins and birth control pills         Use safety caps on medicines.  Household  and polishes must be kept out of reach. Store medicines and  out of sight.  Don't transfer medicines to non-child-proofed or unlabeled containers. Dispose of unused medications. Be especially careful when visiting older persons or families without children in the house.  They may be in the habit of keeping their medicines out on tables.       Syrup of Ipecac is no longer recommended to be kept in the home. Please dispose of any remaining supplies.       Call the Poison Center at 1-274.303.1270 for any known or suspected poisoning.     CAR SAFETY:  An approved rear facing car seat in the back seat of the car is required by [place] law until Jordon is two years old.  A  rear-facing car seat in the back seat is the safest place for your baby. This is especially true if your car is equipped with passenger-side air bags.  Forward-facing seats are not recommended until your child is over two years of age.    SMOKING:  Children exposed to tobacco smoke have more ear infections and pneumonia.  If you smoke, please quit.  If you cannot quit, smoke outside.  Do not smoke near Jordon, do not smoke in the car and do not let others smoke near him.    SUN EXPOSURE:  If you plan to have Jordon outside for more than 30 minutes, please use sun screen.    TEETHING:  Teething children may have no symptoms at all, or they may experience drooling, rashes, crabbiness, or changes in diet.  Do not assume that a fever is due to teething.  Temperatures over 101 degrees are usually from some other cause.  Once the teeth erupt, you should begin cleaning [place] teeth with a washcloth, gauze, or soft toothbrush.  Toothpaste is not necessary yet.      LEAD EXPOSURE:  Jordon will be more mobile in the next few months.  If there is lead in the house, he may be vulnerable.  Let us know if any of the following apply:  1.  Your home was built before 1978 and has peeling or chipping or chalking paint. Homes built in older cities at the turn of the last century may have lead pipes. Check to see if any of the above applies to Jordon's sitter's home, , , or any other house where he spends time.  2.  You have another child or housemate who is being followed or treated for an elevated lead level.  3.  Jordon lives with an adult whose job or hobby involves lead exposure (soldering, battery manufacture, recycling, casting, stained glass, etc.).  4.  You live near an active lead smelter, a battery recycling plant, or a plant that processes hot metal.    DROWNING:  Never leave infants alone in or near water.    MEDICATION FOR FEVER OR PAIN:   Acetaminophen liquid (e.g., Tylenol or Tempra) may be given every  four hours as needed for pain or fever.      INFANT/CHILDREN’S Tylenol/Acetaminophen  (160 MG/5 mL)  Child’s Weight: Dose:  12 - 17 pounds:   80 mg (2.5 mL  (1/2 Teaspoon))  18 - 23 pounds:   120 mg (3.75 mL (3/4 Teaspoon))    Beginning at 6 months of age, Children's Ibuprofen (e.g., Advil or Motrin) may be given every six hours as needed for pain or fever.    Child’s Weight: Dose:  13 - 17 pounds:   1/4 - 1/2 Teaspoon  18 - 23 pounds:   1/2 - 1 Teaspoon    If Jordon is outside these weight ranges, call your pediatrician's office for advice.     Most Recent Immunizations   Administered Date(s) Administered   • DTaP/Hep B/IPV 2017   • HIB 2017   • Hepatitis B Child 2017   • Pneumococcal Conjugate 13 Valent 2017   • Rotavirus - Pentavalent 2017   Pended Date(s) Pended   • DTaP/Hep B/IPV 2017   • HIB 2017   • Pneumococcal Conjugate 13 Valent 2017   • Rotavirus - Pentavalent 2017       If Jordon develops any of the following reactions within 72 hours after an immunization, notify your pediatrician by calling the pediatric phone nurse:  1.  A temperature of 105 degrees or above.  2.  More than 3 hours of continuous crying.  3.  A shrill, high-pitched cry.  4.  A pale, limp spell.  5.  A seizure or fainting spell.  In this case, you should call 911 or go immediately to the emergency room.      NEXT VISIT: NINE MONTHS OF AGE    Thank you for entrusting your care to [facility].

## 2022-02-09 RX ORDER — IBUPROFEN 200 MG
1 TABLET ORAL
Qty: 0 | Refills: 0 | DISCHARGE
Start: 2022-02-09

## 2022-02-09 RX ADMIN — Medication 975 MILLIGRAM(S): at 21:10

## 2022-02-09 RX ADMIN — Medication 600 MILLIGRAM(S): at 17:49

## 2022-02-09 RX ADMIN — Medication 975 MILLIGRAM(S): at 05:48

## 2022-02-09 RX ADMIN — Medication 600 MILLIGRAM(S): at 01:05

## 2022-02-09 RX ADMIN — Medication 975 MILLIGRAM(S): at 11:38

## 2022-02-09 RX ADMIN — Medication 0.2 MILLIGRAM(S): at 00:35

## 2022-02-09 RX ADMIN — Medication 600 MILLIGRAM(S): at 09:42

## 2022-02-09 RX ADMIN — Medication 600 MILLIGRAM(S): at 00:35

## 2022-02-09 RX ADMIN — Medication 975 MILLIGRAM(S): at 12:30

## 2022-02-09 RX ADMIN — Medication 975 MILLIGRAM(S): at 06:15

## 2022-02-09 RX ADMIN — Medication 600 MILLIGRAM(S): at 10:30

## 2022-02-09 RX ADMIN — HEPARIN SODIUM 5000 UNIT(S): 5000 INJECTION INTRAVENOUS; SUBCUTANEOUS at 05:48

## 2022-02-09 RX ADMIN — Medication 975 MILLIGRAM(S): at 20:36

## 2022-02-09 RX ADMIN — HEPARIN SODIUM 5000 UNIT(S): 5000 INJECTION INTRAVENOUS; SUBCUTANEOUS at 16:48

## 2022-02-09 RX ADMIN — Medication 600 MILLIGRAM(S): at 16:48

## 2022-02-10 ENCOUNTER — TRANSCRIPTION ENCOUNTER (OUTPATIENT)
Age: 32
End: 2022-02-10

## 2022-02-10 VITALS
DIASTOLIC BLOOD PRESSURE: 73 MMHG | TEMPERATURE: 97 F | RESPIRATION RATE: 16 BRPM | SYSTOLIC BLOOD PRESSURE: 109 MMHG | OXYGEN SATURATION: 100 % | HEART RATE: 89 BPM

## 2022-02-10 RX ORDER — ACETAMINOPHEN 500 MG
2 TABLET ORAL
Qty: 40 | Refills: 0
Start: 2022-02-10 | End: 2022-02-14

## 2022-02-10 RX ORDER — IBUPROFEN 200 MG
1 TABLET ORAL
Qty: 20 | Refills: 0
Start: 2022-02-10 | End: 2022-02-14

## 2022-02-10 RX ORDER — ACETAMINOPHEN 500 MG
2 TABLET ORAL
Qty: 60 | Refills: 0
Start: 2022-02-10 | End: 2022-02-14

## 2022-02-10 RX ADMIN — Medication 600 MILLIGRAM(S): at 06:03

## 2022-02-10 RX ADMIN — HEPARIN SODIUM 5000 UNIT(S): 5000 INJECTION INTRAVENOUS; SUBCUTANEOUS at 06:01

## 2022-02-10 RX ADMIN — Medication 600 MILLIGRAM(S): at 01:15

## 2022-02-10 RX ADMIN — Medication 975 MILLIGRAM(S): at 09:41

## 2022-02-10 RX ADMIN — Medication 600 MILLIGRAM(S): at 00:24

## 2022-02-10 RX ADMIN — Medication 975 MILLIGRAM(S): at 10:15

## 2022-02-10 RX ADMIN — Medication 600 MILLIGRAM(S): at 11:41

## 2022-02-10 RX ADMIN — Medication 600 MILLIGRAM(S): at 07:00

## 2022-02-10 NOTE — PROGRESS NOTE ADULT - SUBJECTIVE AND OBJECTIVE BOX
OB Progress Note:  Delivery, POD#1    S: 30yo POD#1 s/p uncomplicated rLTCS s/p vasovagal episode this morning at 6am. Pt evaluated at bedside immediately following the event. Per pt, she passed a baseball sized clot in the toilet and stood up from voiding, when she experienced sudden incisional pain, felt lightheaded and "sleepy" and slumped into her partner's arms. She did not lose consciousness (confirmed by partner). During the episode, she denies having palpitations, SOB/CP, changes in vision. Following IV fluid bolus, pt's symptoms improved. Bedside sono revealed clots in the uterus with no color flow.    Prior to her episode, her pain was well controlled. She is tolerating a regular diet and not yet passing flatus. She is ambulating without difficulty and voiding spontaneously. Presently she denies CP/SOB, lightheadedness/dizziness, N/V. Denies HA, changes in vision, RUQ pain, palpitations, increased LE edema.    O:   Vital Signs Last 24 Hrs  T(C): 36.6 (2022 05:08), Max: 36.6 (2022 05:08)  T(F): 97.8 (2022 05:08), Max: 97.8 (2022 05:08)  HR: 150 (2022 05:55) (67 - 150)  BP: 120/97 (2022 05:55) (86/48 - 138/71)  BP(mean): 72 (2022 15:00) (61 - 98)  RR: 18 (2022 05:08) (12 - 23)  SpO2: 98% (2022 05:08) (98% - 100%)    Labs:  Blood type: A Positive  Rubella IgG: RPR: Negative                          8.4<L>   14.25<H> >-----------< 169    (  @ 06:23 )             25.5<L>                        12.5   9.75 >-----------< 224    (  @ 08:18 )             36.7    - @ 06:23      137  |  104  |  7   ----------------------------<  97  4.0   |  21<L>  |  0.67        Ca    8.7      2022 06:23    TPro  5.3<L>  /  Alb  2.8<L>  /  TBili  0.4  /  DBili  x   /  AST  23  /  ALT  9   /  AlkPhos  148<H>  22 @ 06:23      PE:  General: pale, diaphoretic, color notably improving during examination  CV: RRR  Pulm: normal WOB, CTAB  Abdomen: Mildly distended, appropriately tender, incision c/d/i  GYN: minimal lochia on pad, no additional clots expressed  ~150cc clot in toilet  Extremities: No erythema, no pitting edema  
S: 30yo POD#2 s/p scheduled repeat LTCS.  Delivery significant for vasovagal syncope POD #2.  s/p methergine series for clots noted on uterus during TAUS on 2/8.  Pain is well controlled. She is tolerating a regular diet and passing flatus. She is voiding spontaneously, and ambulating without difficulty. Denies CP/SOB. Denies lightheadedness/dizziness. Denies N/V.      O:  Vitals:  Vital Signs Last 24 Hrs  T(C): 36.4 (09 Feb 2022 05:54), Max: 36.6 (08 Feb 2022 09:58)  T(F): 97.6 (09 Feb 2022 05:54), Max: 97.9 (08 Feb 2022 09:58)  HR: 74 (09 Feb 2022 05:54) (72 - 89)  BP: 103/67 (09 Feb 2022 05:54) (102/58 - 109/64)  BP(mean): --  RR: 16 (09 Feb 2022 05:54) (16 - 18)  SpO2: 99% (09 Feb 2022 05:54) (98% - 99%)    MEDICATIONS  (STANDING):  acetaminophen     Tablet .. 975 milliGRAM(s) Oral <User Schedule>  diphtheria/tetanus/pertussis (acellular) Vaccine (ADAcel) 0.5 milliLiter(s) IntraMuscular once  heparin   Injectable 5000 Unit(s) SubCutaneous every 12 hours  ibuprofen  Tablet. 600 milliGRAM(s) Oral every 6 hours  influenza   Vaccine 0.5 milliLiter(s) IntraMuscular once  lactated ringers Bolus 500 milliLiter(s) IV Bolus once  lactated ringers. 1000 milliLiter(s) (125 mL/Hr) IV Continuous <Continuous>      MEDICATIONS  (PRN):  diphenhydrAMINE 25 milliGRAM(s) Oral every 6 hours PRN Pruritus  lanolin Ointment 1 Application(s) Topical every 6 hours PRN Sore Nipples  magnesium hydroxide Suspension 30 milliLiter(s) Oral two times a day PRN Constipation  oxyCODONE    IR 5 milliGRAM(s) Oral every 3 hours PRN Moderate to Severe Pain (4-10)  oxyCODONE    IR 5 milliGRAM(s) Oral once PRN Moderate to Severe Pain (4-10)  simethicone 80 milliGRAM(s) Chew every 4 hours PRN Gas      Labs:  Blood type: A Positive  Rubella IgG: RPR: Negative                          9.9<L>   14.57<H> >-----------< 192    ( 02-08 @ 11:25 )             30.9<L>                        8.4<L>   14.25<H> >-----------< 169    ( 02-08 @ 06:23 )             25.5<L>                        12.5   9.75 >-----------< 224    ( 02-07 @ 08:18 )             36.7    02-08-22 @ 06:23      137  |  104  |  7   ----------------------------<  97  4.0   |  21<L>  |  0.67        Ca    8.7      08 Feb 2022 06:23    TPro  5.3<L>  /  Alb  2.8<L>  /  TBili  0.4  /  DBili  x   /  AST  23  /  ALT  9   /  AlkPhos  148<H>  02-08-22 @ 06:23          PE:  General: NAD  Abdomen: Soft, appropriately tender, incision c/d/i with steristrips  Lochia: WNL  Extremities: No calf tenderness    A/P: 30yo POD#2 s/p repeat LTCS.  Patient is stable and doing well post-operatively.    - Continue regular diet.  - Increase ambulation.  - Continue motrin, tylenol, oxycodone PRN for pain control.    - Discharge planning    Margareth BRUNO
pt seen and evaluated by me, POD # 2  doing well,  continue routine post op care 
Postop Day  __1_ s/p   C- Section    THERAPY:  [ x ] Spinal morphine   [  ] Epidural morphine   [  ] IV PCA Hydromorphone 1 mg/ml      Sedation Score:	  [ x ] Alert	    [  ] Drowsy        [  ] Arousable	[  ] Asleep	[  ] Unresponsive    Side Effects:	  [ x ] None	     [  ] Nausea        [  ] Pruritus        [  ] Weakness   [  ] Numbness        ASSESSMENT/ PLAN   [   ] Discontinue         [  ] Continue  [ x ]Documentation and Verification of current medications     Comments:       Patient is doing well.  OOBAA. Tolerating clears.  Pain is tolerable.  No residual anesthetic issues or complications noted.  
S: 30yo POD#3 s/p scheduled repeat LTCS.  Delivery significant for vasovagal syncope POD #1.  s/p methergine series for clots noted on uterus during TAUS on 2/8.  Pain is well controlled. She is tolerating a regular diet and passing flatus. She is voiding spontaneously, and ambulating without difficulty. Denies CP/SOB. Denies lightheadedness/dizziness. Denies N/V.    O:  Vitals:  Vital Signs Last 24 Hrs  T(C): 36.4 (10 Feb 2022 06:24), Max: 36.6 (09 Feb 2022 09:55)  T(F): 97.5 (10 Feb 2022 06:24), Max: 97.8 (09 Feb 2022 09:55)  HR: 82 (10 Feb 2022 06:24) (82 - 88)  BP: 122/70 (10 Feb 2022 06:24) (96/65 - 122/70)  BP(mean): --  RR: 17 (10 Feb 2022 06:24) (16 - 18)  SpO2: 95% (10 Feb 2022 06:24) (95% - 100%)    MEDICATIONS  (STANDING):  acetaminophen     Tablet .. 975 milliGRAM(s) Oral <User Schedule>  diphtheria/tetanus/pertussis (acellular) Vaccine (ADAcel) 0.5 milliLiter(s) IntraMuscular once  heparin   Injectable 5000 Unit(s) SubCutaneous every 12 hours  ibuprofen  Tablet. 600 milliGRAM(s) Oral every 6 hours  influenza   Vaccine 0.5 milliLiter(s) IntraMuscular once  lactated ringers Bolus 500 milliLiter(s) IV Bolus once  lactated ringers. 1000 milliLiter(s) (125 mL/Hr) IV Continuous <Continuous>    MEDICATIONS  (PRN):  diphenhydrAMINE 25 milliGRAM(s) Oral every 6 hours PRN Pruritus  lanolin Ointment 1 Application(s) Topical every 6 hours PRN Sore Nipples  magnesium hydroxide Suspension 30 milliLiter(s) Oral two times a day PRN Constipation  oxyCODONE    IR 5 milliGRAM(s) Oral every 3 hours PRN Moderate to Severe Pain (4-10)  oxyCODONE    IR 5 milliGRAM(s) Oral once PRN Moderate to Severe Pain (4-10)  simethicone 80 milliGRAM(s) Chew every 4 hours PRN Gas      LABS:  Blood type: A Positive  Rubella IgG: RPR: Negative                          9.9<L>   14.57<H> >-----------< 192    ( 02-08 @ 11:25 )             30.9<L>                        8.4<L>   14.25<H> >-----------< 169    ( 02-08 @ 06:23 )             25.5<L>    02-08-22 @ 06:23      137  |  104  |  7   ----------------------------<  97  4.0   |  21<L>  |  0.67        Ca    8.7      08 Feb 2022 06:23    TPro  5.3<L>  /  Alb  2.8<L>  /  TBili  0.4  /  DBili  x   /  AST  23  /  ALT  9   /  AlkPhos  148<H>  02-08-22 @ 06:23      Physical exam:  Gen: NAD  Abdomen: Soft, nontender, no distension , firm uterine fundus at umbilicus.  Incision: Clean, dry, and intact with steristrips  Pelvic: Normal lochia noted  Ext: No calf tenderness    A/P: 30yo POD#3 s/p repeat LTCS.  Patient is stable and doing well post-operatively.    - Continue regular diet.  - Increase ambulation.  - Continue motrin, tylenol, oxycodone PRN for pain control.    - Discharge planned for today.      Margareth BRUNO

## 2022-02-10 NOTE — DISCHARGE NOTE NURSING/CASE MANAGEMENT/SOCIAL WORK - PATIENT PORTAL LINK FT
You can access the FollowMyHealth Patient Portal offered by [facility] by registering at the following website: [email or web address]. By joining [employer]’s FollowMyHealth portal, you will also be able to view your health information using other applications (apps) compatible with our system.

## 2022-02-10 NOTE — DISCHARGE NOTE NURSING/CASE MANAGEMENT/SOCIAL WORK - NSDCPEFALRISK_GEN_ALL_CORE
For information on Fall & Injury Prevention, visit: [email or web address] OR  [email or web address] OR  https://www.cdc.gov/steadi/patient.html

## 2022-03-16 ENCOUNTER — APPOINTMENT (OUTPATIENT)
Dept: OBGYN | Facility: CLINIC | Age: 32
End: 2022-03-16

## 2022-04-04 PROBLEM — Z78.9 OTHER SPECIFIED HEALTH STATUS: Chronic | Status: ACTIVE | Noted: 2022-02-03

## 2022-04-05 ENCOUNTER — APPOINTMENT (OUTPATIENT)
Dept: OBGYN | Facility: CLINIC | Age: 32
End: 2022-04-05
Payer: COMMERCIAL

## 2022-04-05 ENCOUNTER — NON-APPOINTMENT (OUTPATIENT)
Age: 32
End: 2022-04-05

## 2022-04-05 VITALS
DIASTOLIC BLOOD PRESSURE: 87 MMHG | WEIGHT: 115 LBS | HEIGHT: 61 IN | BODY MASS INDEX: 21.71 KG/M2 | HEART RATE: 69 BPM | SYSTOLIC BLOOD PRESSURE: 125 MMHG

## 2022-04-05 DIAGNOSIS — Z30.9 ENCOUNTER FOR CONTRACEPTIVE MANAGEMENT, UNSPECIFIED: ICD-10-CM

## 2022-04-05 PROCEDURE — 0503F POSTPARTUM CARE VISIT: CPT

## 2022-04-05 NOTE — HISTORY OF PRESENT ILLNESS
[Delivery Date: ___] : on [unfilled] [Girl] : baby is a girl [Infant's Name ___] : [unfilled] [___ Lbs] : [unfilled] lbs [___ Oz] : [unfilled] oz [Living at Home] : is currently living at home [Resumed Menses] : has resumed her menses [Resumed [place]] : has resumed intercourse [Intended Contraception] : Intended Contraception: [Repeat C/S] : delivered by  section (repeat) [Rhogam] : Rhogam was not administered [Rubella Vaccine] : Rubella vaccine was not administered [Pertussis Vaccine] : Pertussis vaccine was not administered [BTL] : no tubal ligation [Breastfeeding] : not currently nursing [S/Sx PP Depression] : signs/symptoms of postpartum depression [[place] Depression Scale ___ (0-30)] : [unfilled] [(0) As much as I always could] : (0) No, not at all [(0) No, not at all] : (0) No, not at all [None] : No associated symptoms are reported [Clean/Dry/Intact] : clean, dry and intact [Erythema] : not erythematous [Swelling] : not swollen [Dehiscence] : not dehisced [Back to Normal] : is back to normal in size [Normal] : the vagina was normal [Examination Of The Breasts] : breasts are normal [Doing Well] : is doing well [No Sign of Infection] : is showing no signs of infection [Excellent Pain Control] : has excellent pain control [de-identified] : formula [de-identified] : rto for annual. rx for ocp

## 2022-07-12 ENCOUNTER — EMERGENCY (EMERGENCY)
Facility: HOSPITAL | Age: 32
LOS: 1 days | Discharge: HOME | End: 2022-07-12
Admitting: EMERGENCY MEDICINE

## 2022-07-12 VITALS
RESPIRATION RATE: 18 BRPM | TEMPERATURE: 98 F | DIASTOLIC BLOOD PRESSURE: 97 MMHG | HEIGHT: 61 IN | OXYGEN SATURATION: 99 % | SYSTOLIC BLOOD PRESSURE: 139 MMHG | HEART RATE: 86 BPM

## 2022-07-12 VITALS
TEMPERATURE: 98 F | DIASTOLIC BLOOD PRESSURE: 83 MMHG | OXYGEN SATURATION: 99 % | HEART RATE: 72 BPM | SYSTOLIC BLOOD PRESSURE: 119 MMHG | RESPIRATION RATE: 16 BRPM

## 2022-07-12 DIAGNOSIS — Z98.890 OTHER SPECIFIED POSTPROCEDURAL STATES: Chronic | ICD-10-CM

## 2022-07-12 LAB
ALBUMIN SERPL ELPH-MCNC: 4.4 G/DL — SIGNIFICANT CHANGE UP (ref 3.3–5)
ALP SERPL-CCNC: 55 U/L — SIGNIFICANT CHANGE UP (ref 40–120)
ALT FLD-CCNC: 17 U/L — SIGNIFICANT CHANGE UP (ref 4–33)
ANION GAP SERPL CALC-SCNC: 14 MMOL/L — SIGNIFICANT CHANGE UP (ref 7–14)
AST SERPL-CCNC: 20 U/L — SIGNIFICANT CHANGE UP (ref 4–32)
BASOPHILS # BLD AUTO: 0.04 K/UL — SIGNIFICANT CHANGE UP (ref 0–0.2)
BASOPHILS NFR BLD AUTO: 0.5 % — SIGNIFICANT CHANGE UP (ref 0–2)
BILIRUB SERPL-MCNC: 0.8 MG/DL — SIGNIFICANT CHANGE UP (ref 0.2–1.2)
BUN SERPL-MCNC: 9 MG/DL — SIGNIFICANT CHANGE UP (ref 7–23)
CALCIUM SERPL-MCNC: 9.5 MG/DL — SIGNIFICANT CHANGE UP (ref 8.4–10.5)
CHLORIDE SERPL-SCNC: 103 MMOL/L — SIGNIFICANT CHANGE UP (ref 98–107)
CO2 SERPL-SCNC: 22 MMOL/L — SIGNIFICANT CHANGE UP (ref 22–31)
CREAT SERPL-MCNC: 0.64 MG/DL — SIGNIFICANT CHANGE UP (ref 0.5–1.3)
EGFR: 121 ML/MIN/1.73M2 — SIGNIFICANT CHANGE UP
EOSINOPHIL # BLD AUTO: 0.56 K/UL — HIGH (ref 0–0.5)
EOSINOPHIL NFR BLD AUTO: 7.4 % — HIGH (ref 0–6)
GLUCOSE SERPL-MCNC: 92 MG/DL — SIGNIFICANT CHANGE UP (ref 70–99)
HCG SERPL-ACNC: <5 MIU/ML — SIGNIFICANT CHANGE UP
HCT VFR BLD CALC: 44 % — SIGNIFICANT CHANGE UP (ref 34.5–45)
HGB BLD-MCNC: 14.7 G/DL — SIGNIFICANT CHANGE UP (ref 11.5–15.5)
IANC: 4.09 K/UL — SIGNIFICANT CHANGE UP (ref 1.8–7.4)
IMM GRANULOCYTES NFR BLD AUTO: 0.3 % — SIGNIFICANT CHANGE UP (ref 0–1.5)
LYMPHOCYTES # BLD AUTO: 2.45 K/UL — SIGNIFICANT CHANGE UP (ref 1–3.3)
LYMPHOCYTES # BLD AUTO: 32.3 % — SIGNIFICANT CHANGE UP (ref 13–44)
MCHC RBC-ENTMCNC: 29.5 PG — SIGNIFICANT CHANGE UP (ref 27–34)
MCHC RBC-ENTMCNC: 33.4 GM/DL — SIGNIFICANT CHANGE UP (ref 32–36)
MCV RBC AUTO: 88.2 FL — SIGNIFICANT CHANGE UP (ref 80–100)
MONOCYTES # BLD AUTO: 0.42 K/UL — SIGNIFICANT CHANGE UP (ref 0–0.9)
MONOCYTES NFR BLD AUTO: 5.5 % — SIGNIFICANT CHANGE UP (ref 2–14)
NEUTROPHILS # BLD AUTO: 4.09 K/UL — SIGNIFICANT CHANGE UP (ref 1.8–7.4)
NEUTROPHILS NFR BLD AUTO: 54 % — SIGNIFICANT CHANGE UP (ref 43–77)
NRBC # BLD: 0 /100 WBCS — SIGNIFICANT CHANGE UP
NRBC # FLD: 0.02 K/UL — HIGH
PLATELET # BLD AUTO: 313 K/UL — SIGNIFICANT CHANGE UP (ref 150–400)
POTASSIUM SERPL-MCNC: 4 MMOL/L — SIGNIFICANT CHANGE UP (ref 3.5–5.3)
POTASSIUM SERPL-SCNC: 4 MMOL/L — SIGNIFICANT CHANGE UP (ref 3.5–5.3)
PROT SERPL-MCNC: 7.5 G/DL — SIGNIFICANT CHANGE UP (ref 6–8.3)
RBC # BLD: 4.99 M/UL — SIGNIFICANT CHANGE UP (ref 3.8–5.2)
RBC # FLD: 14.1 % — SIGNIFICANT CHANGE UP (ref 10.3–14.5)
SODIUM SERPL-SCNC: 139 MMOL/L — SIGNIFICANT CHANGE UP (ref 135–145)
WBC # BLD: 7.58 K/UL — SIGNIFICANT CHANGE UP (ref 3.8–10.5)
WBC # FLD AUTO: 7.58 K/UL — SIGNIFICANT CHANGE UP (ref 3.8–10.5)

## 2022-07-12 PROCEDURE — 99284 EMERGENCY DEPT VISIT MOD MDM: CPT

## 2022-07-12 PROCEDURE — 73562 X-RAY EXAM OF KNEE 3: CPT | Mod: 26,RT

## 2022-07-12 RX ORDER — IBUPROFEN 200 MG
1 TABLET ORAL
Qty: 25 | Refills: 0
Start: 2022-07-12

## 2022-07-12 RX ORDER — KETOROLAC TROMETHAMINE 30 MG/ML
30 SYRINGE (ML) INJECTION ONCE
Refills: 0 | Status: DISCONTINUED | OUTPATIENT
Start: 2022-07-12 | End: 2022-07-12

## 2022-07-12 RX ORDER — LORATADINE 10 MG/1
1 TABLET ORAL
Qty: 20 | Refills: 0
Start: 2022-07-12

## 2022-07-12 RX ADMIN — Medication 30 MILLIGRAM(S): at 23:27

## 2022-07-12 RX ADMIN — Medication 100 MILLIGRAM(S): at 20:18

## 2022-07-12 NOTE — ED ADULT TRIAGE NOTE - CHIEF COMPLAINT QUOTE
Patient fell on her right knee two weeks ago and a scar formed. The knee is infected. Sent to ER by MD for antibiotics.

## 2022-07-12 NOTE — ED PROVIDER NOTE - PATIENT PORTAL LINK FT
You can access the FollowMyHealth Patient Portal offered by [facility] by registering at the following website: [email or web address]. By joining [employer]’s FollowMyHealth portal, you will also be able to view your health information using other applications (apps) compatible with our system.

## 2022-07-12 NOTE — ED ADULT NURSE NOTE - OBJECTIVE STATEMENT
Pt received to intake 12  , awake and alert, A&OX4, ambulatory. C/o R knee swelling and redness s/p mechanical fall 2 weeks ago. States she covered laceration with bandaid for a few days and then noticed purulent drainage. States it is painful and itchy. R knee red and swollen. Respirations even and unlabored. Resting comfortably. Denies CP, SOB, N/V, HA, dizziness, palpitations, fatigue. 20G IV placed to  RAC   . Bed in lowest position, call bell within reach. Will continue to monitor.

## 2022-07-12 NOTE — ED PROVIDER NOTE - MUSCULOSKELETAL, MLM
right knee: mild swelling to the knee with central healing abrasion. circumscribe redness 8cm x 8cm. no crepitus, fluctuance. purulent drainage. no tenderness. freely ranging knee. Spine appears normal, range of motion is not limited, no muscle or joint tenderness

## 2022-07-12 NOTE — ED PROVIDER NOTE - CLINICAL SUMMARY MEDICAL DECISION MAKING FREE TEXT BOX
patient presenting with right knee cellulitis s/p mechanical fall sustained 2 weeks ago. patient well appearing, vitals stable, ambulating, ranging knee w/o difficulty. Concerns for septic joint is low. plan for routine labs, IV clindamycin, xray to r/o FB vs subcutaneous air

## 2022-07-12 NOTE — ED PROVIDER NOTE - PROGRESS NOTE DETAILS
PA Smartt: xray negative. labs did not demonstrate on gross abnormal values. patient offered CDU for IV abx. but patient prefer to try PO abx. return precaution discussed

## 2022-08-13 NOTE — OB RN DELIVERY SUMMARY - APGAR COMPLETED BY
Problem: Pressure Injury - Risk of  Goal: *Prevention of pressure injury  Description: Document Dejuan Scale and appropriate interventions in the flowsheet. Outcome: Progressing Towards Goal  Note: Pressure Injury Interventions:  Sensory Interventions: Assess changes in LOC, Avoid rigorous massage over bony prominences, Chair cushion, Check visual cues for pain, Discuss PT/OT consult with provider, Float heels, Keep linens dry and wrinkle-free, Maintain/enhance activity level    Moisture Interventions: Apply protective barrier, creams and emollients, Absorbent underpads, Assess need for specialty bed, Check for incontinence Q2 hours and as needed, Contain wound drainage, Limit adult briefs, Maintain skin hydration (lotion/cream), Minimize layers, Moisture barrier    Activity Interventions: Chair cushion, Increase time out of bed, Assess need for specialty bed    Mobility Interventions: Chair cushion, Float heels, Turn and reposition approx. every two hours(pillow and wedges)    Nutrition Interventions: Document food/fluid/supplement intake, Discuss nutritional consult with provider, Offer support with meals,snacks and hydration    Friction and Shear Interventions: HOB 30 degrees or less, Minimize layers, Apply protective barrier, creams and emollients, Feet elevated on foot rest, Foam dressings/transparent film/skin sealants, Transfer aides:transfer board/John lift/ceiling lift, Transferring/repositioning devices                Problem: Patient Education: Go to Patient Education Activity  Goal: Patient/Family Education  Outcome: Progressing Towards Goal     Problem: Falls - Risk of  Goal: *Absence of Falls  Description: Document Petty Fall Risk and appropriate interventions in the flowsheet.   Outcome: Progressing Towards Goal  Note: Fall Risk Interventions:  Mobility Interventions: Bed/chair exit alarm    Mentation Interventions: Adequate sleep, hydration, pain control    Medication Interventions: Bed/chair exit alarm    Elimination Interventions: Bed/chair exit alarm    History of Falls Interventions: Door open when patient unattended         Problem: Patient Education: Go to Patient Education Activity  Goal: Patient/Family Education  Outcome: Progressing Towards Goal     Problem: General Medical Care Plan  Goal: *Vital signs within specified parameters  Outcome: Progressing Towards Goal  Goal: *Labs within defined limits  Outcome: Progressing Towards Goal  Goal: *Absence of infection signs and symptoms  Outcome: Progressing Towards Goal  Goal: *Optimal pain control at patient's stated goal  Outcome: Progressing Towards Goal  Goal: *Skin integrity maintained  Outcome: Progressing Towards Goal  Goal: *Fluid volume balance  Outcome: Progressing Towards Goal  Goal: *Optimize nutritional status  Outcome: Progressing Towards Goal  Goal: *Anxiety reduced or absent  Outcome: Progressing Towards Goal  Goal: *Progressive mobility and function (eg: ADL's)  Outcome: Progressing Towards Goal     Problem: Patient Education: Go to Patient Education Activity  Goal: Patient/Family Education  Outcome: Progressing Towards Goal     Problem: Risk for Spread of Infection  Goal: Prevent transmission of infectious organism to others  Description: Prevent the transmission of infectious organisms to other patients, staff members, and visitors. Outcome: Progressing Towards Goal     Problem: Patient Education:  Go to Education Activity  Goal: Patient/Family Education  Outcome: Progressing Towards Goal     Problem: Diabetes Self-Management  Goal: *Disease process and treatment process  Description: Define diabetes and identify own type of diabetes; list 3 options for treating diabetes. Outcome: Progressing Towards Goal  Goal: *Incorporating nutritional management into lifestyle  Description: Describe effect of type, amount and timing of food on blood glucose; list 3 methods for planning meals.   Outcome: Progressing Towards Goal  Goal: *Incorporating physical activity into lifestyle  Description: State effect of exercise on blood glucose levels. Outcome: Progressing Towards Goal  Goal: *Developing strategies to promote health/change behavior  Description: Define the ABC's of diabetes; identify appropriate screenings, schedule and personal plan for screenings. Outcome: Progressing Towards Goal  Goal: *Using medications safely  Description: State effect of diabetes medications on diabetes; name diabetes medication taking, action and side effects. Outcome: Progressing Towards Goal  Goal: *Monitoring blood glucose, interpreting and using results  Description: Identify recommended blood glucose targets  and personal targets. Outcome: Progressing Towards Goal  Goal: *Prevention, detection, treatment of acute complications  Description: List symptoms of hyper- and hypoglycemia; describe how to treat low blood sugar and actions for lowering  high blood glucose level. Outcome: Progressing Towards Goal  Goal: *Prevention, detection and treatment of chronic complications  Description: Define the natural course of diabetes and describe the relationship of blood glucose levels to long term complications of diabetes.   Outcome: Progressing Towards Goal  Goal: *Developing strategies to address psychosocial issues  Description: Describe feelings about living with diabetes; identify support needed and support network  Outcome: Progressing Towards Goal  Goal: *Insulin pump training  Outcome: Progressing Towards Goal  Goal: *Sick day guidelines  Outcome: Progressing Towards Goal  Goal: *Patient Specific Goal (EDIT GOAL, INSERT TEXT)  Outcome: Progressing Towards Goal     Problem: Patient Education: Go to Patient Education Activity  Goal: Patient/Family Education  Outcome: Progressing Towards Goal Nurse

## 2022-11-10 NOTE — OB RN PATIENT PROFILE - BP NONINVASIVE SYSTOLIC (MM HG)
Dr Kim, pt is requesting for you to take over on refills for nifedipine 30MG & lisinopril 30MG.    Pt was last seen 10/14/22    Next appointment 11/23/22.   108

## 2022-11-28 ENCOUNTER — RX RENEWAL (OUTPATIENT)
Age: 32
End: 2022-11-28

## 2022-11-28 RX ORDER — NORETHINDRONE ACETATE AND ETHINYL ESTRADIOL 1; 20 MG/1; UG/1
1-20 TABLET ORAL DAILY
Qty: 21 | Refills: 8 | Status: ACTIVE | COMMUNITY
Start: 2022-04-05 | End: 1900-01-01

## 2024-05-08 NOTE — OB PROVIDER DELIVERY SUMMARY - NSCSDELIVAASS_OBGYN_ALL_OB
Reason For Visit  Emil Watkins is 76 year old male patient here today for a consultation regarding lipoma on back.    Referred By  Nela Auguste MD    Care Team:   Patient Care Team:  Nela Auguste MD as PCP - General    The patient was offered a chaperone declines.    Accompanied by: unaccompanied       N/A

## 2025-02-06 ENCOUNTER — APPOINTMENT (OUTPATIENT)
Dept: ORTHOPEDIC SURGERY | Facility: CLINIC | Age: 35
End: 2025-02-06

## 2025-02-07 ENCOUNTER — APPOINTMENT (OUTPATIENT)
Dept: ORTHOPEDIC SURGERY | Facility: CLINIC | Age: 35
End: 2025-02-07
Payer: COMMERCIAL

## 2025-02-07 DIAGNOSIS — S69.81XA OTHER SPECIFIED INJURIES OF RIGHT WRIST, HAND AND FINGER(S), INITIAL ENCOUNTER: ICD-10-CM

## 2025-02-07 DIAGNOSIS — M77.8 OTHER ENTHESOPATHIES, NOT ELSEWHERE CLASSIFIED: ICD-10-CM

## 2025-02-07 PROCEDURE — 99203 OFFICE O/P NEW LOW 30 MIN: CPT
